# Patient Record
Sex: FEMALE | Race: WHITE | NOT HISPANIC OR LATINO | Employment: UNEMPLOYED | URBAN - NONMETROPOLITAN AREA
[De-identification: names, ages, dates, MRNs, and addresses within clinical notes are randomized per-mention and may not be internally consistent; named-entity substitution may affect disease eponyms.]

---

## 2017-07-24 ENCOUNTER — OFFICE VISIT (OUTPATIENT)
Dept: ORTHOPEDIC SURGERY | Facility: CLINIC | Age: 67
End: 2017-07-24

## 2017-07-24 ENCOUNTER — HOSPITAL ENCOUNTER (OUTPATIENT)
Dept: GENERAL RADIOLOGY | Facility: HOSPITAL | Age: 67
Discharge: HOME | End: 2017-07-24
Attending: ORTHOPAEDIC SURGERY | Admitting: ORTHOPAEDIC SURGERY

## 2017-07-24 VITALS — DIASTOLIC BLOOD PRESSURE: 84 MMHG | SYSTOLIC BLOOD PRESSURE: 160 MMHG | HEIGHT: 60 IN | HEART RATE: 68 BPM

## 2017-07-24 DIAGNOSIS — S63.289A DISLOCATION OF PIP JOINT OF FINGER, INITIAL ENCOUNTER: Primary | ICD-10-CM

## 2017-07-24 PROCEDURE — 26770 TREAT FINGER DISLOCATION: CPT | Performed by: ORTHOPAEDIC SURGERY

## 2017-07-24 PROCEDURE — 73130 X-RAY EXAM OF HAND: CPT

## 2017-07-24 PROCEDURE — 73130 X-RAY EXAM OF HAND: CPT | Performed by: RADIOLOGY

## 2017-07-24 NOTE — PROGRESS NOTES
New Patient Visit        Patient: Ariana So  YOB: 1950  Date of encounter: 7/24/2017      History of Present Illness:   Ariana So is a 67 y.o. female who is referred here today by [facility] for evaluation of acute injury to her left hand.  She states on July 22, 2017 she tripped and fell.  She states she tried to catch herself but she landed on the left hand.  She states she had immediate pain and deformity to the finger.  She did proceed to first care where x-rays were taken and she was placed in AlumaFoam splint.  She still complains of pain and deformity to the fifth finger.  She denies paresthesias.    PMH:   There is no problem list on file for this patient.    No past medical history on file.    PSH:  No past surgical history on file.    Allergies:     Allergies   Allergen Reactions   • Penicillins        Medications:   No current outpatient prescriptions on file.    Social History:  Social History     Social History   • Marital status:      Spouse name: N/A   • Number of children: N/A   • Years of education: N/A     Occupational History   • Not on file.     Social History Main Topics   • Smoking status: Not on file   • Smokeless tobacco: Not on file   • Alcohol use Not on file   • Drug use: Not on file   • Sexual activity: Not on file     Other Topics Concern   • Not on file     Social History Narrative       Family History:   No family history on file.    Review of Systems:   Review of Systems   Constitutional: Negative.    HENT: Negative.    Respiratory: Negative.    Cardiovascular: Positive for leg swelling.   Gastrointestinal: Negative.    Genitourinary:        Positive for stress incontinence     Musculoskeletal:        Pertinent positives mentioned in HPI   Skin: Negative.    Neurological: Negative.    Hematological: Negative.    Psychiatric/Behavioral: Negative.        Physical Exam: 67 y.o. female  General Appearance:    Alert and oriented x 3, cooperative, in  "no acute distress                   Vitals:    07/24/17 1311   BP: 160/84   Pulse: 68   Height: 60\" (152.4 cm)                Musculoskeletal: Examination the left hand reveals moderate swelling with ecchymosis.  She has gross deformity and inability to move the PIP joint of the fifth finger.  Her neurovascular status is intact.    Radiology:     Initial x-rays of the left hand were reviewed revealing of dislocation of the left fifth PIP joint.    Postreduction films of the left hand reveal no further dislocation of the PIP joint with anatomic alignment.  No acute fractures noted.    Assessment    ICD-10-CM ICD-9-CM   1. Dislocation of PIP joint of finger, initial encounter S63.289A 834.02       Plan:   A 67-year-old female with a left fifth PIP joint dislocation.  Today we reduced the fifth PIP joint and placed her in a dorsal splint.  Repeat x-rays after reduction reveal improved alignment.  There is no further dislocation and no evidence of acute fracture.  She is wearing her brace full time and return back in one week for repeat x-rays to check alignment.    Written by, Ifeoma KIRBY, acting as a scribe for Dr. Darius OTTO, Dr. Olu Valenzuela  "

## 2017-07-25 DIAGNOSIS — S63.289A DISLOCATION OF FINGER PIP JOINT, INITIAL ENCOUNTER: Primary | ICD-10-CM

## 2017-07-31 ENCOUNTER — OFFICE VISIT (OUTPATIENT)
Dept: ORTHOPEDIC SURGERY | Facility: CLINIC | Age: 67
End: 2017-07-31

## 2017-07-31 ENCOUNTER — HOSPITAL ENCOUNTER (OUTPATIENT)
Dept: GENERAL RADIOLOGY | Facility: HOSPITAL | Age: 67
Discharge: HOME | End: 2017-07-31
Attending: ORTHOPAEDIC SURGERY | Admitting: ORTHOPAEDIC SURGERY

## 2017-07-31 VITALS — HEIGHT: 60 IN

## 2017-07-31 DIAGNOSIS — S63.289D DISLOCATION OF PROXIMAL INTERPHALANGEAL JOINT OF FINGER, SUBSEQUENT ENCOUNTER: Primary | ICD-10-CM

## 2017-07-31 DIAGNOSIS — S63.289A DISLOCATION OF FINGER PIP JOINT, INITIAL ENCOUNTER: ICD-10-CM

## 2017-07-31 PROCEDURE — 73130 X-RAY EXAM OF HAND: CPT | Performed by: RADIOLOGY

## 2017-07-31 PROCEDURE — 99024 POSTOP FOLLOW-UP VISIT: CPT | Performed by: ORTHOPAEDIC SURGERY

## 2017-07-31 PROCEDURE — 73130 X-RAY EXAM OF HAND: CPT

## 2017-07-31 NOTE — PROGRESS NOTES
Ariana So   :1950    Date of encounter:2017        HPI:  Ariana So is a 67 y.o. female who returns here today for follow-up of a left fifth finger PIP joint dislocation.  Her date of injury was 2017.  She has been in a splint since her last office visit we can ago.  She states that she's tolerating it well.  She states pain is decreasing.  She denies paresthesias.      Exam:   Examination of the left fifth finger reveals mild swelling.  She is able to flex the PIP joint and proximally 10°.  No gross instability.  Her neurovascular status is intact.    Radiology:   3 views of the left hand were reviewed revealing no acute fracture or dislocation.    Assessment    ICD-10-CM ICD-9-CM   1. Dislocation of proximal interphalangeal joint of finger, subsequent encounter S63.289D V58.89       Plan:   A 67-year-old female with a left fifth PIP joint dislocation.  X-rays today reveal joint in place with no fractures.  She is doing well and we will reduce down just to matias taping of the fourth and fifth fingers.  She can begin gentle range of motion exercises and she'll return back for follow-up and repeat x-rays in 3 weeks.    Written by, Ifeoma KIRBY, acting as a scribe for Dr. Darius Valenzuela

## 2017-08-18 DIAGNOSIS — S63.289D DISLOCATION OF PROXIMAL INTERPHALANGEAL JOINT OF FINGER, SUBSEQUENT ENCOUNTER: Primary | ICD-10-CM

## 2017-08-23 ENCOUNTER — HOSPITAL ENCOUNTER (OUTPATIENT)
Dept: GENERAL RADIOLOGY | Facility: HOSPITAL | Age: 67
End: 2017-08-23
Attending: ORTHOPAEDIC SURGERY